# Patient Record
(demographics unavailable — no encounter records)

---

## 2024-10-08 NOTE — PHYSICAL EXAM
[Normal Breath Sounds] : Normal breath sounds [Abdomen Masses] : No abdominal masses [JVD] : no jugular venous distention  [de-identified] : Well-healed right subcostal scar.  Significant rectus diastases without a ventral hernia present [de-identified] : No distress

## 2024-10-08 NOTE — ASSESSMENT
[FreeTextEntry1] : 66-year-old male here for initial valuation for possible ventral hernia.  On exam patient does not in fact have a midline ventral hernia.  He has a significant diastases of his rectus muscles.  He does not seem to have significant abdominal dysfunction at this time.  I do not believe his diastases is related to the symptoms he is describing with occasional episodes of dyspnea.  His symptoms do not likely correlate with any intra-abdominal pathology.  He does have a small hiatal hernia appreciated on prior CT scan.  Unclear if this is symptoms of precordial catch or potential symptoms of an arrhythmia that has not previously been picked up on EKG.  Given his prior cardiac history would recommend following up with his cardiologist Dr. Conteh.  Can consider following up with Dr. Parham regarding the hiatal hernia as I generally do not repair these, although I suspect it is not the source of his respiratory symptoms.  Discussed briefly the repair of diastases recti would not recommend proceeding with this unless he had significant abdominal wall dysfunction with difficulty sitting up which he currently does not describe.

## 2024-10-08 NOTE — PHYSICAL EXAM
[Normal Breath Sounds] : Normal breath sounds [Abdomen Masses] : No abdominal masses [JVD] : no jugular venous distention  [de-identified] : Well-healed right subcostal scar.  Significant rectus diastases without a ventral hernia present [de-identified] : No distress

## 2024-10-08 NOTE — HISTORY OF PRESENT ILLNESS
[FreeTextEntry1] : 65 yo M with hx of open cholecystectomy, last seen for a possible hernia in 2020, returns for similar complaint. Patient reports a bulge in the midline that he has been told is a hernia. Reports it is not painful but reports a "pressure" like sensation. Has never had anything stuck in it, never needed to go to the ER. Bulge is in midline but incision from gallbladder is right subcostal. Patient reports frequent shortness of breath and thinks it may be from the hernia. Patient has been seen by an pulmonologist several years ago, where he underwent pulmonary tests which was negative. Patient reports hx of Lyme disease 35 years ago.  Patient underwent a colonoscopy in the past. Patient has hx of MI. Patient smokes marijuana.  He is here to discuss additional treatment options.  Patient shortness of breath does not seem to correlate with any activity.

## 2024-10-08 NOTE — END OF VISIT
[FreeTextEntry3] :   All medical record entries made by the scribe were at my, INDIA MARTINEZ, direction and personally dictated by me on 10/8/2024. I have reviewed the chart and agree that the record accurately reflects my personal performance of the history, physical exam, assessment, and plan. I have also personally directed, reviewed, and agreed with the chart. [Time Spent: ___ minutes] : I have spent [unfilled] minutes of time on the encounter which excludes teaching and separately reported services.

## 2025-01-02 NOTE — HEALTH RISK ASSESSMENT
[No falls in past year] : Patient reported no falls in the past year [Nearly Every Day (3)] : 7.) Trouble concentrating on things, such as reading a newspaper or watching television? Nearly every day [Not at All (0)] : 9.) Thoughts that you would be off dead or of hurting yourself in some way? Not at all [Moderately Severe] : Severity of Depression is Moderately Severe [Very Difficult] : How difficult have these problems made it for you to do your work, take care of things at home, or get along with people? Very difficult [Never] : Never [[id number]] : 18

## 2025-01-02 NOTE — REVIEW OF SYSTEMS
[Dyspnea on Exertion] : dyspnea on exertion [Negative] : Heme/Lymph [Shortness Of Breath] : no shortness of breath [Wheezing] : no wheezing [Cough] : no cough [FreeTextEntry2] : fatigued

## 2025-01-02 NOTE — HISTORY OF PRESENT ILLNESS
[FreeTextEntry1] : follow up Out of breath on exertion [de-identified] : Pt awakens bwtween 9:30 to 10:30 AM. It takes several hours before he can do anything. He can eat breakfast, watch tv, but is not motivated.  About March pt started on Repatha. Says the statin was giving him "misteps in his brain." Repatha ave him flu like symptoms initially, and then gave him headaches.  He stopped the Repatha in June.  He has not been breathing well and thinks that it may be due to rectus diastesis.  Pt's wife says that he can walk slowly on a level surface but becomes out of breath if there is an incline. Sees a Lyme specialist, was on atorvaquone for approx 2 months, Felt better initially and now feels worse again.  Feeling like he has no air causes him great stress. He has been back and forth to [place] and was at 5000 feet above sea level and did not have any issues with breathing on one of the trips, Went again and had issue breathing.  This has been for years now. Has had pulmonary testing and follows with cardiology. No issues were found with his lungs.  Pt says he is down as he lost his health in his 20's. He has been on many antidepressants but most have not worked. One helped a little.

## 2025-01-02 NOTE — PHYSICAL EXAM
[No JVD] : no jugular venous distention [Normal] : normal rate, regular rhythm, normal S1 and S2 and no murmur heard [No Focal Deficits] : no focal deficits [Speech Grossly Normal] : speech grossly normal [Alert and Oriented x3] : oriented to person, place, and time [Normal Mood] : the mood was normal

## 2025-02-05 NOTE — HISTORY OF PRESENT ILLNESS
[FreeTextEntry1] : 66-year-old man Unanticipated visit Previously followed here for atherosclerotic heart disease hypertension hyperlipidemia.  Nilesh has an multiple symptoms including severe "itch" on the bottom of his feet.  Positional dizziness/lightheadedness limits his ability to ambulate.  He complains of profound fatigue.  Chest discomfort is attributed to a hiatal hernia.  He carries a diagnosis of Lyme disease and is anticipating treatment through Dr. Lima for a "recurrence"  Mr. Dye is accompanied today by his wife.

## 2025-02-05 NOTE — PHYSICAL EXAM
[Normal Conjunctiva] : normal conjunctiva [Clear Lung Fields] : clear lung fields [Normal S1, S2] : normal S1, S2 [Soft] : abdomen soft [Non Tender] : non-tender [Normal Bowel Sounds] : normal bowel sounds [Normal Gait] : normal gait [No Rash] : no rash [No Focal Deficits] : no focal deficits [de-identified] : appears somewehat over weight in no distress lying flat [de-identified] : normocephalic [de-identified] : no murmur. no gallop. no diastolic sounds [de-identified] : no neck vein distention. no carotid bruit [de-identified] : full range of motion [de-identified] : no edema  dorsalis pedis pulses +2 bilaterally [de-identified] : pleasant

## 2025-02-05 NOTE — PHYSICAL EXAM
[Normal Conjunctiva] : normal conjunctiva [Normal S1, S2] : normal S1, S2 [Clear Lung Fields] : clear lung fields [Soft] : abdomen soft [Non Tender] : non-tender [Normal Bowel Sounds] : normal bowel sounds [Normal Gait] : normal gait [No Rash] : no rash [No Focal Deficits] : no focal deficits [de-identified] : appears somewehat over weight in no distress lying flat [de-identified] : normocephalic [de-identified] : no murmur. no gallop. no diastolic sounds [de-identified] : no neck vein distention. no carotid bruit [de-identified] : full range of motion [de-identified] : no edema  dorsalis pedis pulses +2 bilaterally [de-identified] : pleasant

## 2025-02-05 NOTE — DISCUSSION/SUMMARY
[FreeTextEntry1] : Dizziness Nilehs has an multiple symptoms including severe "itch" on the bottom of his feet.  Positional dizziness/lightheadedness limits his ability to ambulate.  He complains of profound fatigue.  Chest discomfort is attributed to a hiatal hernia.  He carries a diagnosis of Lyme disease and is anticipating treatment through Dr. Lima for a "recurrence" Comment I doubt a cardiovascular/hemodynamic etiology to the patient's symptoms.  The relationship to Lyme disease is obscure however Nilesh feels that this problem is playing a significant role. Laboratory studies in 1/25 by Dr. Smith have effectively eliminated a metabolic abnormality to explain the patient's symptoms  There does appear to be an element of psychological component to the patient's presentation.  I have recommended the following Workup and treatment as directed by Dr. Lima   Atherosclerotic heart disease Mr. Dye has known atherosclerotic heart disease   . in 10/22 Nilesh had an acute inferior wall myocardial infarction. RCA proximal 90% stenosis was addressed by PCI/LUIS CARLOS with distal embolization of the posterior lateral branch.. In a  staged procedure the LAD mid 90% stenosis required PCI/LUIS CARLOS x2. A ramus branch had a 50% stenosis. The circumflex and second obtuse marginal branch had a 70% stenosis. Left ventricular systolic function was mildly depressed  reflected by a left ventricular ejection fraction of 45-50%.    Following revascularization and medical therapy left ventricular systolic function has normalized. The    1/24  electrocardiogram shows no evidence of myocardial ischemia or infarction.  A 1/23 stress sestamibi study revealed normal perfusion.  The left ventricular ejection fraction was 66% The 1/23 echocardiogram showed a left ventricular end-diastolic dimension of 4.4 cm and a left ventricular ejection fraction of 61% In view of the lack of symptoms and above noninvasive studies continued medical management is indicated at this time.  Measures to control modifiable risk factors for the development of atherosclerotic disease will be important long-term management.  I have recommended the following a. Risk factor modification b. Continue the present medical regimen c   Echocardiogram d  Stress nuclear study     Hypertension There is a long history of hypertension. Previous treatments including hydrochlorothiazide enalapril and lisinopril were either ineffective or not tolerated. Amlodipine may have been beneficial but  was previously  discontinued for uncertain reasons.I n response to symptoms of dyspnea/fatigue attributed to the administration of metoprolol it was stopped in 7/23  Prior treatment with olmesartan was effective and well tolerated. It was discontinued following the acute inferior  wall myocardial infarction in 10/22 .  In the setting of atherosclerotic  heart disease and prior myocardial infarction beta blocker and Ace-I./ARB therapy are attractive. The patient's intolerance to multiple medical interventions has made treatment challenging Nonpharmacological therapy, specifically diet exercise and weight loss are emphasized as major aspects of treatment.    I have recommended the following a. Low salt low fat low cholesterol diet. Regular aerobic exercise weight loss b. Continue the present medical regimen c. Home blood pressure monitoring d   reinstitution  of  olmesartan  if  required to maintain optimal levels  e  . periodic routine laboratory studies including electrolytes and renal function  tests  through primary care  Dr. Smith     Hyperlipidemia Hyperlipidemia represents a risk factor for progressive  atherosclerotic heart disease. The target LDL level with known atherosclerotic heart disease is about 70. HMG Co. a reductase inhibitor therapy was not tolerated. In 5/24 Repatha was started with a favorable response.  In 7/24 the serum cholesterol levels was 100 and HDL 31 triglycerides 88 and LDL 51. In 12/24 Repatha was discontinued in response to adverse effects manifested by nasal congestion.  In 1/25 in the absence of any antihyperlipidemic medical therapy the serum cholesterol level was 210 triglycerides 157 HDL 33 and   Nonpharmacological therapy, specifically diet exercise and weight loss  are  emphasized  as  major aspects of treatment.  I  I have recommended the following a. Low-salt low-fat low-cholesterol diet. Regular aerobic exercise b. Target LDL level to about 70 as discussed above     Valvular heart disease The 7/23 echocardiogram revealed moderate aortic valve sclerosis with trace mitral regurgitation.  Left ventricular ejection fraction was 61% The present cardiac physical  examination is not suggestive of hemodynamically significant valvular pathology.    I  have  recommended the following a. Echocardiogram     Obesity/Overweight Obesity exacerbates Nilesh's  cardiovascular issues. Today Mr. Dye is 5 feet 8 inches tall and weighs 188   pounds. He has gained 8 pounds in the last 6 months   Diet exercise and weight loss are advised.     The diagnosis, prognosis, risks, options and alternatives were explained at length to the patient. All questions were answered. Issues discussed included dizziness fatigue shortness of breath atherosclerotic heart disease  left ventricular systolic dysfunction  hypertension antihypertensive agents Lyme  disease noninvasive cardiac testing diet and exercise.  Counseling and/or coordination of care Time was a significant factor for this patient encounter. Total time spent with the patient   was 40 minutes. Greater than 50% of the time was devoted to counseling and/or  coordination of care

## 2025-02-05 NOTE — DISCUSSION/SUMMARY
[FreeTextEntry1] : Dizziness Nilesh has an multiple symptoms including severe "itch" on the bottom of his feet.  Positional dizziness/lightheadedness limits his ability to ambulate.  He complains of profound fatigue.  Chest discomfort is attributed to a hiatal hernia.  He carries a diagnosis of Lyme disease and is anticipating treatment through Dr. Lima for a "recurrence" Comment I doubt a cardiovascular/hemodynamic etiology to the patient's symptoms.  The relationship to Lyme disease is obscure however Nilesh feels that this problem is playing a significant role. Laboratory studies in 1/25 by Dr. Smith have effectively eliminated a metabolic abnormality to explain the patient's symptoms  There does appear to be an element of psychological component to the patient's presentation.  I have recommended the following Workup and treatment as directed by Dr. Lima   Atherosclerotic heart disease Mr. Dye has known atherosclerotic heart disease   . in 10/22 Nilesh had an acute inferior wall myocardial infarction. RCA proximal 90% stenosis was addressed by PCI/LUIS CARLOS with distal embolization of the posterior lateral branch.. In a  staged procedure the LAD mid 90% stenosis required PCI/LUIS CARLOS x2. A ramus branch had a 50% stenosis. The circumflex and second obtuse marginal branch had a 70% stenosis. Left ventricular systolic function was mildly depressed  reflected by a left ventricular ejection fraction of 45-50%.    Following revascularization and medical therapy left ventricular systolic function has normalized. The    1/24  electrocardiogram shows no evidence of myocardial ischemia or infarction.  A 1/23 stress sestamibi study revealed normal perfusion.  The left ventricular ejection fraction was 66% The 1/23 echocardiogram showed a left ventricular end-diastolic dimension of 4.4 cm and a left ventricular ejection fraction of 61% In view of the lack of symptoms and above noninvasive studies continued medical management is indicated at this time.  Measures to control modifiable risk factors for the development of atherosclerotic disease will be important long-term management.  I have recommended the following a. Risk factor modification b. Continue the present medical regimen c   Echocardiogram d  Stress nuclear study     Hypertension There is a long history of hypertension. Previous treatments including hydrochlorothiazide enalapril and lisinopril were either ineffective or not tolerated. Amlodipine may have been beneficial but  was previously  discontinued for uncertain reasons.I n response to symptoms of dyspnea/fatigue attributed to the administration of metoprolol it was stopped in 7/23  Prior treatment with olmesartan was effective and well tolerated. It was discontinued following the acute inferior  wall myocardial infarction in 10/22 .  In the setting of atherosclerotic  heart disease and prior myocardial infarction beta blocker and Ace-I./ARB therapy are attractive. The patient's intolerance to multiple medical interventions has made treatment challenging Nonpharmacological therapy, specifically diet exercise and weight loss are emphasized as major aspects of treatment.    I have recommended the following a. Low salt low fat low cholesterol diet. Regular aerobic exercise weight loss b. Continue the present medical regimen c. Home blood pressure monitoring d   reinstitution  of  olmesartan  if  required to maintain optimal levels  e  . periodic routine laboratory studies including electrolytes and renal function  tests  through primary care  Dr. Smith     Hyperlipidemia Hyperlipidemia represents a risk factor for progressive  atherosclerotic heart disease. The target LDL level with known atherosclerotic heart disease is about 70. HMG Co. a reductase inhibitor therapy was not tolerated. In 5/24 Repatha was started with a favorable response.  In 7/24 the serum cholesterol levels was 100 and HDL 31 triglycerides 88 and LDL 51. In 12/24 Repatha was discontinued in response to adverse effects manifested by nasal congestion.  In 1/25 in the absence of any antihyperlipidemic medical therapy the serum cholesterol level was 210 triglycerides 157 HDL 33 and   Nonpharmacological therapy, specifically diet exercise and weight loss  are  emphasized  as  major aspects of treatment.  I  I have recommended the following a. Low-salt low-fat low-cholesterol diet. Regular aerobic exercise b. Target LDL level to about 70 as discussed above     Valvular heart disease The 7/23 echocardiogram revealed moderate aortic valve sclerosis with trace mitral regurgitation.  Left ventricular ejection fraction was 61% The present cardiac physical  examination is not suggestive of hemodynamically significant valvular pathology.    I  have  recommended the following a. Echocardiogram     Obesity/Overweight Obesity exacerbates Nilesh's  cardiovascular issues. Today Mr. Dye is 5 feet 8 inches tall and weighs 188   pounds. He has gained 8 pounds in the last 6 months   Diet exercise and weight loss are advised.     The diagnosis, prognosis, risks, options and alternatives were explained at length to the patient. All questions were answered. Issues discussed included dizziness fatigue shortness of breath atherosclerotic heart disease  left ventricular systolic dysfunction  hypertension antihypertensive agents Lyme  disease noninvasive cardiac testing diet and exercise.  Counseling and/or coordination of care Time was a significant factor for this patient encounter. Total time spent with the patient   was 40 minutes. Greater than 50% of the time was devoted to counseling and/or  coordination of care

## 2025-03-08 NOTE — PHYSICAL EXAM
[Normal Conjunctiva] : normal conjunctiva [Normal S1, S2] : normal S1, S2 [Clear Lung Fields] : clear lung fields [Soft] : abdomen soft [Non Tender] : non-tender [Normal Bowel Sounds] : normal bowel sounds [Normal Gait] : normal gait [No Rash] : no rash [No Focal Deficits] : no focal deficits [de-identified] : appears somewehat over weight in no distress lying flat [de-identified] : normocephalic [de-identified] : no neck vein distention. no carotid bruit [de-identified] : no murmur. no gallop. no diastolic sounds [de-identified] : full range of motion [de-identified] : no edema  dorsalis pedis pulses +2 bilaterally [de-identified] : pleasant

## 2025-03-08 NOTE — HISTORY OF PRESENT ILLNESS
[FreeTextEntry1] : 66-year-old man Routine follow-up for atherosclerotic heart disease hypertension hyperlipidemia valvular heart disease Nilesh continues to complain bitterly of positional dizziness and headache.  Blood pressure levels have been persistently 160 mmHg systolic.  No chest pain.  Mr. Dye is accompanied today by his wife

## 2025-03-08 NOTE — PHYSICAL EXAM
[Normal Conjunctiva] : normal conjunctiva [Normal S1, S2] : normal S1, S2 [Clear Lung Fields] : clear lung fields [Soft] : abdomen soft [Non Tender] : non-tender [Normal Bowel Sounds] : normal bowel sounds [Normal Gait] : normal gait [No Rash] : no rash [No Focal Deficits] : no focal deficits [de-identified] : appears somewehat over weight in no distress lying flat [de-identified] : normocephalic [de-identified] : no neck vein distention. no carotid bruit [de-identified] : no murmur. no gallop. no diastolic sounds [de-identified] : full range of motion [de-identified] : no edema  dorsalis pedis pulses +2 bilaterally [de-identified] : pleasant

## 2025-03-08 NOTE — DISCUSSION/SUMMARY
[FreeTextEntry1] : Dizziness  Positional dizziness/lightheadedness limits his ability to ambulate.  Blood pressure levels during symptoms typically are 150-160 mmHg systolic Comment I doubt a cardiovascular/hemodynamic etiology to the patient's symptoms.  The relationship to Lyme disease is obscure .. Laboratory studies in 1/25 by Dr. Smith have effectively eliminated a metabolic abnormality to explain the patient's symptoms  There does appear to be an element of psychological component to the patient's presentation.  I have recommended the following Workup and treatment as directed by Dr. Lima Neurological evaluation   Atherosclerotic heart disease Mr. Dye has known atherosclerotic heart disease   . in 10/22 Nilesh had an acute inferior wall myocardial infarction. RCA proximal 90% stenosis was addressed by PCI/LUIS CARLOS with distal embolization of the posterior lateral branch.. In a  staged procedure the LAD mid 90% stenosis required PCI/LUIS CARLOS x2. A ramus branch had a 50% stenosis. The circumflex and second obtuse marginal branch had a 70% stenosis. Left ventricular systolic function was mildly depressed  reflected by a left ventricular ejection fraction of 45-50%.    Following revascularization and medical therapy left ventricular systolic function has normalized. The    2/25   electrocardiogram shows no evidence of myocardial ischemia or infarction.  A 2/25  stress sestamibi study revealed normal perfusion.  The left ventricular ejection fraction was 70% The 2/25  echocardiogram showed a left ventricular end-diastolic dimension of 4. 74 cm and a left ventricular ejection fraction of  55-60%. In view of the lack of symptoms and above noninvasive studies continued medical management is indicated at this time.  Measures to control modifiable risk factors for the development of atherosclerotic disease will be important long-term management.  I have recommended the following a. Risk factor modification b. No further cardiac testing for this problem at this time     Hypertension There is a long history of hypertension. Previous treatments including hydrochlorothiazide enalapril and lisinopril were either ineffective or not tolerated. Amlodipine may have been beneficial but  was previously  discontinued for uncertain reasons.I n response to symptoms of dyspnea/fatigue attributed to the administration of metoprolol it was stopped in 7/23  Prior treatment with olmesartan was effective and well tolerated. It was discontinued following the acute inferior  wall myocardial infarction in 10/22 .  In the setting of atherosclerotic  heart disease and prior myocardial infarction beta blocker and Ace-I./ARB therapy are attractive. The patient's intolerance to multiple medical interventions has made treatment challenging Nonpharmacological therapy, specifically diet exercise and weight loss are emphasized as major aspects of treatment.    I have recommended the following a. Low salt low fat low cholesterol diet. Regular aerobic exercise weight loss b. Olmesartan 20 mg/day with further dose adjustment dictated by tolerance and response c. Home blood pressure monitoring d  . periodic routine laboratory studies including electrolytes and renal function  tests  through primary care  Dr. Smith     Hyperlipidemia Hyperlipidemia represents a risk factor for progressive  atherosclerotic heart disease. The target LDL level with known atherosclerotic heart disease is about 70. HMG Co. a reductase inhibitor therapy was not tolerated. In 5/24 Repatha was started with a favorable response.  In 7/24 the serum cholesterol levels was 100 and HDL 31 triglycerides 88 and LDL 51. In 12/24 Repatha was discontinued in response to adverse effects manifested by nasal congestion.  In 1/25 in the absence of any antihyperlipidemic medical therapy the serum cholesterol level was 210 triglycerides 157 HDL 33 and   Nonpharmacological therapy, specifically diet exercise and weight loss  are  emphasized  as  major aspects of treatment.    I have recommended the following a. Low-salt low-fat low-cholesterol diet. Regular aerobic exercise b. Target LDL level to about 70 as discussed above     Valvular heart disease The 2/25  echocardiogram revealed mild aortic valve sclerosis with trace mitral /tricuspid regurgitation.  Left ventricular ejection fraction was 55-60% The present cardiac physical  examination is not suggestive of hemodynamically significant valvular pathology.    I  have  recommended the following a.  No further cardiac testing for this problem at this time.    Obesity/Overweight Obesity exacerbates Nilesh's  cardiovascular issues. Today Mr. Dye is 5 feet 8 inches tall and weighs 192   pounds. He has gained 12  pounds in the last 8  months   Diet exercise and weight loss are advised.     The diagnosis, prognosis, risks, options and alternatives were explained at length to the patient. All questions were answered. Issues discussed included dizziness fatigue shortness of breath atherosclerotic heart disease  left ventricular systolic dysfunction  hypertension antihypertensive agents Lyme  disease noninvasive cardiac testing diet and exercise.  Counseling and/or coordination of care Time was a significant factor for this patient encounter. Total time spent with the patient   was 30 minutes. Greater than 50% of the time was devoted to counseling and/or  coordination of care

## 2025-04-10 NOTE — HEALTH RISK ASSESSMENT
[Very Good] : ~his/her~  mood as very good [No] : In the past 12 months have you used drugs other than those required for medical reasons? No [No falls in past year] : Patient reported no falls in the past year [0] : 2) Feeling down, depressed, or hopeless: Not at all (0) [PHQ-2 Negative - No further assessment needed] : PHQ-2 Negative - No further assessment needed [Never] : Never [With Family] : lives with family [Retired] : retired [] :  [Feels Safe at Home] : Feels safe at home [Fully functional (bathing, dressing, toileting, transferring, walking, feeding)] : Fully functional (bathing, dressing, toileting, transferring, walking, feeding) [Fully functional (using the telephone, shopping, preparing meals, housekeeping, doing laundry, using] : Fully functional and needs no help or supervision to perform IADLs (using the telephone, shopping, preparing meals, housekeeping, doing laundry, using transportation, managing medications and managing finances) [Reports changes in dental health] : Reports changes in dental health [Yes] : takes [None] : None [With Significant Other] : lives with significant other [Employed] : employed [On disability] : on disability [# Of Children ___] : has [unfilled] children [[id number]] : 0 [Reports changes in hearing] : Reports no changes in hearing [Reports changes in vision] : Reports no changes in vision [ColonoscopyComments] : Dr Salazar,  does not want to have it done at present and prefers colonoscopy to cologuard.  [FreeTextEntry2] :

## 2025-04-10 NOTE — HISTORY OF PRESENT ILLNESS
[PMH Reviewed and Updated] : past medical history reviewed and updated [PSH Reviewed and Updated] : past surgical history reviewed and updated [Family History Reviewed and Updated] : family history reviewed and updated [Medication and Allergies Reconciled] : medication and allergies reconciled [0] : 0 [Retired] : retired from work [Never] : has never used illicit drugs [Walking] : walking [Compliant with medications] : compliant with medications [Spouse] : spouse [Friends] : friends [Extended Family] : extended family [Fully Independent] : fully independent [Drives without concerns] : drives without concerns [No history of falls] : no history of falls [Seatbelts] : seatbelts [Bicycle Helmet] : bicycle helmet [Safe Driving Habits] : safe driving habits [Smoke Detectors] : smoke detectors [Carbon Monoxide Detector] : carbon monoxide detector [Hot Water Temp <120F] : hot water temp <120F [Sunscreen] : sunscreen [CPR Training for Patient] : received CPR training for patient [Patient Has Not Designated Health Care Proxy/Advanced Directive] : patient has not designated Health Care Proxy/Advanced Directive [FreeTextEntry1] : annual wellness. [de-identified] : Pt here for annual wellness. Has been following with Dr Lima for his Lyme and is now on Atorvaquone, Doxycycline and Azithromycin,  Also on  Fludrocortisone. He has been on these meds since mid February.  He has chronic Lyme disease. Says it comes on with stress. He got back to  from [place] 1 week before Christmas. He had a stressful time as his friend was ill. Also currently  from his wife for 2 weeks.  Says that in January he felt bed. Feels general malaise. When she turns his head, he is not seeing what's in front of his, sees what he was looking at before. Feels that his brain takes time to catch up with his eyes.  He understands that a lot of what he feels may be psychological but  does not want any meds. He has tried many meds in the past and they have not agreed with it. He has been in a one month day program at [facility], but this was many years ago.    [Over the Past 2 Weeks, Have You Felt Down, Depressed, or Hopeless?] : 1.) Over the past 2 weeks, have you felt down, depressed, or hopeless? No [Over the Past 2 Weeks, Have You Felt Little Interest or Pleasure Doing Things?] : 2.) Over the past 2 weeks, have you felt little interest or pleasure doing things? No [Motorcycle Helmet] : not using motorcycle helmet [Bathroom Grab Bars] : not using bathroom grab bars [Fall Prevention Measures] : not using fall prevention measures [Gun Trigger Locks] : not using gun trigger locks [Gun Safe] : not using a gun safe [CPR Training for Household] : did not receive CPR training for patient [FreeTextEntry2] : none [de-identified] : none [de-identified] : none

## 2025-04-10 NOTE — REVIEW OF SYSTEMS
[Fatigue] : fatigue [Joint Pain] : joint pain [Depression] : depression [Negative] : Heme/Lymph [Fever] : no fever [Suicidal] : not suicidal [Insomnia] : no insomnia [FreeTextEntry6] : gets OOB easily [de-identified] : feels in a way that is difficult to describe, like brain fog

## 2025-04-10 NOTE — PHYSICAL EXAM
[Normal Sclera/Conjunctiva] : normal sclera/conjunctiva [Normal Outer Ear/Nose] : the outer ears and nose were normal in appearance [No JVD] : no jugular venous distention [No Lymphadenopathy] : no lymphadenopathy [Supple] : supple [No Edema] : there was no peripheral edema [Normal] : soft, non-tender, non-distended, no masses palpated, no HSM and normal bowel sounds [Normal Posterior Cervical Nodes] : no posterior cervical lymphadenopathy [Normal Anterior Cervical Nodes] : no anterior cervical lymphadenopathy [No CVA Tenderness] : no CVA  tenderness [No Spinal Tenderness] : no spinal tenderness [No Joint Swelling] : no joint swelling [Grossly Normal Strength/Tone] : grossly normal strength/tone [No Rash] : no rash [Coordination Grossly Intact] : coordination grossly intact [No Focal Deficits] : no focal deficits [Normal Gait] : normal gait [Speech Grossly Normal] : speech grossly normal [Normal Affect] : the affect was normal [Alert and Oriented x3] : oriented to person, place, and time [Normal Insight/Judgement] : insight and judgment were intact [de-identified] : benign appearing skin lesions [de-identified] : speaks very excitedly
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
## 2025-07-10 NOTE — PHYSICAL EXAM
[No Acute Distress] : no acute distress [No Respiratory Distress] : no respiratory distress  [No Accessory Muscle Use] : no accessory muscle use [Clear to Auscultation] : lungs were clear to auscultation bilaterally [Normal Rate] : normal rate  [Regular Rhythm] : with a regular rhythm [Normal S1, S2] : normal S1 and S2 [No Murmur] : no murmur heard [No Focal Deficits] : no focal deficits [Normal Insight/Judgement] : insight and judgment were intact [Well-Appearing] : well-appearing [Normal Gait] : normal gait [Speech Grossly Normal] : speech grossly normal [Alert and Oriented x3] : oriented to person, place, and time [Normal Mood] : the mood was normal [No Edema] : there was no peripheral edema [Grossly Normal Strength/Tone] : grossly normal strength/tone [Coordination Grossly Intact] : coordination grossly intact
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
## 2025-07-10 NOTE — REVIEW OF SYSTEMS
[Fatigue] : fatigue [Joint Pain] : joint pain [Headache] : headache [Insomnia] : insomnia [Anxiety] : anxiety [Depression] : depression [Negative] : Heme/Lymph

## 2025-07-24 NOTE — DATA REVIEWED
[de-identified] :  Exam: MRI BRAIN Order#: [id number]    History: BILATERAL HEADACHES.  MRI brain without contrast 7/13/2025  Multiplanar and multiecho sequence imaging of brain obtained.  Comparison made to previous MRI brain 2/14/2018   The midline sagittal structures including the pituitary, corpus callosum, pineal and craniocervical junction regions are unremarkable.  The ventricles are within normal limits in size. The cerebral and cerebellar hemispheres and brainstem are within normal limits. There is no abnormal increased or decreased signal within the brain. Diffusion imaging reveals no acute or recent infarct. There is no hemorrhage. There are no abnormal extra-axial collections.  The distal internal carotid and vertebral basilar artery flow-voids are intact.  There is no pathologic marrow placement. The visualized orbits unremarkable. Progressive complete mucosal opacification of sphenoid sinus with T1 shortening may relate to inspissated secretions or fungal colonization with surrounding T2/FLAIR hyperintense mucosal thickening.   Impression:   No acute infarct, edema or hemorrhage.  Extensive progressive chronic sphenoid sinusitis. Consider follow-up CT sinus and/or ENT consultation.

## 2025-07-24 NOTE — DATA REVIEWED
[de-identified] :  Exam: MRI BRAIN Order#: [id number]    History: BILATERAL HEADACHES.  MRI brain without contrast 7/13/2025  Multiplanar and multiecho sequence imaging of brain obtained.  Comparison made to previous MRI brain 2/14/2018   The midline sagittal structures including the pituitary, corpus callosum, pineal and craniocervical junction regions are unremarkable.  The ventricles are within normal limits in size. The cerebral and cerebellar hemispheres and brainstem are within normal limits. There is no abnormal increased or decreased signal within the brain. Diffusion imaging reveals no acute or recent infarct. There is no hemorrhage. There are no abnormal extra-axial collections.  The distal internal carotid and vertebral basilar artery flow-voids are intact.  There is no pathologic marrow placement. The visualized orbits unremarkable. Progressive complete mucosal opacification of sphenoid sinus with T1 shortening may relate to inspissated secretions or fungal colonization with surrounding T2/FLAIR hyperintense mucosal thickening.   Impression:   No acute infarct, edema or hemorrhage.  Extensive progressive chronic sphenoid sinusitis. Consider follow-up CT sinus and/or ENT consultation.

## 2025-07-24 NOTE — ASSESSMENT
[FreeTextEntry1] : Prevention - Gabapentin 300 mg once daily 2 hours before bedtime for headaches, will help with sleep and anxiety also - Magnesium glycinate 400 mg once daily 2 hours before bedtime   - B12 supplement 1000 mcg once daily.   B12 level was 300 7/10/25  -  3,2,1 rule before bed. Cut out caffeine 10 hours before bed. Don't eat or drink alcohol 3 hours before bed. Stop working/exercise 2 hours before bed. Get away from your screens 1 hours before bed - Please follow-up with your ID doctor regarding Lyme treatment - Recommended considering CBT for anxiety. Pt states he has the name of someone but has been apprehensive to try. He is not interested in medications to help with anxiety (pt notes poor tolerance previously) - Please increase water intake at least 64 oz water daily - Pt has ENT apt next month to f/u on chronic sinusitis seen on MRI/CT sinuses - Pt has audiology apt for hearing evaluation  - Maintain HA diary (noting environment, mood, frequency, treatment, duration) - Encouraged to engage in daily routine exercise at least 30 min daily - Heat therapy 20 min, 3 times a day followed by light stretching for trapezius spasm   - Follow up in 3 months with Szui Moran NP

## 2025-07-24 NOTE — PHYSICAL EXAM
[FreeTextEntry1] : Physical examination  General: No acute distress, Awake, Alert.   Mental status  Awake, alert, and oriented to person, time and place, Normal attention span and concentration, Recent and remote memory intact, Language intact, Fund of knowledge intact.  Cranial Nerves  II: VFF  III, IV, VI: PERRL, EOMI.  V: Facial sensation is normal B/L.  VII: Facial strength is normal B/L.  VIII: Gross hearing is intact.  IX, X: Palate is midline and elevates symmetrically.  XI: Trapezius normal strength.  XII: Tongue midline without atrophy or fasciculations.   Motor exam  Muscle tone - no evidence of rigidity or resistance in all 4 extremities.  No atrophy or fasciculations  Muscle Strength: arms and legs, proximal and distal flexors and extensors are normal  No UE drift. B/l essential tremor noted with arms outstretched  Reflexes  All present, normal, and symmetrical.  Plantars right: mute.  Plantars left: mute.   Coordination  Finger to nose: End point tremor noted on L hand. No dysmetria Heel to shin: Normal.   Sensory  Intact sensation to vibration and cold. Romberg negative  Gait  Normal Able to toe heel and tandem normal
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
## 2025-07-24 NOTE — ASSESSMENT
[FreeTextEntry1] : Prevention - Gabapentin 300 mg once daily 2 hours before bedtime for headaches, will help with sleep and anxiety also - Magnesium glycinate 400 mg once daily 2 hours before bedtime   - B12 supplement 1000 mcg once daily.   B12 level was 300 7/10/25  -  3,2,1 rule before bed. Cut out caffeine 10 hours before bed. Don't eat or drink alcohol 3 hours before bed. Stop working/exercise 2 hours before bed. Get away from your screens 1 hours before bed - Please follow-up with your ID doctor regarding Lyme treatment - Recommended considering CBT for anxiety. Pt states he has the name of someone but has been apprehensive to try. He is not interested in medications to help with anxiety (pt notes poor tolerance previously) - Please increase water intake at least 64 oz water daily - Pt has ENT apt next month to f/u on chronic sinusitis seen on MRI/CT sinuses - Pt has audiology apt for hearing evaluation  - Maintain HA diary (noting environment, mood, frequency, treatment, duration) - Encouraged to engage in daily routine exercise at least 30 min daily - Heat therapy 20 min, 3 times a day followed by light stretching for trapezius spasm   - Follow up in 3 months with Suzi Moran NP